# Patient Record
Sex: FEMALE | Race: AMERICAN INDIAN OR ALASKA NATIVE
[De-identification: names, ages, dates, MRNs, and addresses within clinical notes are randomized per-mention and may not be internally consistent; named-entity substitution may affect disease eponyms.]

---

## 2018-08-16 ENCOUNTER — HOSPITAL ENCOUNTER (EMERGENCY)
Dept: HOSPITAL 42 - ED | Age: 58
Discharge: HOME | End: 2018-08-16
Payer: MEDICAID

## 2018-08-16 VITALS
HEART RATE: 74 BPM | OXYGEN SATURATION: 99 % | DIASTOLIC BLOOD PRESSURE: 77 MMHG | SYSTOLIC BLOOD PRESSURE: 134 MMHG | RESPIRATION RATE: 18 BRPM

## 2018-08-16 VITALS — TEMPERATURE: 98.1 F

## 2018-08-16 DIAGNOSIS — I10: ICD-10-CM

## 2018-08-16 DIAGNOSIS — E78.00: ICD-10-CM

## 2018-08-16 DIAGNOSIS — M54.30: Primary | ICD-10-CM

## 2018-08-16 DIAGNOSIS — Z87.891: ICD-10-CM

## 2018-08-16 PROCEDURE — 99282 EMERGENCY DEPT VISIT SF MDM: CPT

## 2018-08-16 PROCEDURE — 96372 THER/PROPH/DIAG INJ SC/IM: CPT

## 2018-08-16 NOTE — ED PDOC
Arrival/HPI





- General


Chief Complaint: Back Pain


Time Seen by Provider: 18 07:17


Historian: Patient





- History of Present Illness


Narrative History of Present Illness (Text): 





18 07:27


58 year old female, whose past medical history includes hypertension and 

occasionally elevated cholesterol, who presents to the emergency department 

complaining of aching waxing and waning pain in lower back and top left of 

buttocks for the past 6-7 months, worse past few days. Patient notes the pain 

radiates down to the back of her left leg. Patient states the pain is worse at 

night and interrupts her sleep, noting exacerbation when trying to turn over. 

Patient states she takes Percocet, with mild relief. Patient notes some 

tingling and numbness, but states she is able to ambulate normal. Patient 

denies any known drug allergies, fevers, chills, chest pain, shortness of breath

, abdominal pain, nausea, vomiting, diarrhea, neck pain, urinary symptoms, 

headache, dizziness, or any other complaint. 





PMD: Dr. Cullen Mraquis





Time/Duration: Other (last couple of weeks)


Symptom Onset: Other (waxing and waning for past 6-7 months)


Symptom Course: Unchanged


Activities at Onset: Light





Past Medical History





- Provider Review


Nursing Documentation Reviewed: Yes





- Travel History


Have you recently traveled outside [place] w/in the past 3 mons?: No





- Infectious Disease


Hx of Infectious Diseases: None





- Reproductive


Menopause: Yes





- Cardiac


Hx Hypertension: Yes





- Musculoskeletal/Rheumatological


Hx Arthritis: Yes





- Psychiatric


Hx Substance Use: No





- Surgical History


Hx  Section: Yes (x3)


Hx Hysterectomy: Yes (partial)





- Anesthesia


Hx Anesthesia: Yes


Hx Anesthesia Reactions: No


Hx Malignant Hyperthermia: No





Family/Social History





- Physician Review


Nursing Documentation Reviewed: Yes


Family/Social History: No Known Family HX


Smoking Status: Former Smoker


Hx Alcohol Use: No


Hx Substance Use: No





Allergies/Home Meds


Allergies/Adverse Reactions: 


Allergies





No Known Allergies Allergy (Verified 18 06:52)


 








Home Medications: 


 Home Meds











 Medication  Instructions  Recorded  Confirmed


 


Acetaminophen/Oxycodone Hydr 1 tab PO DAILY 18





[Percocet 10/325 mg Tab]   


 


Aspirin [Ecotrin] 1 tab PO DAILY 18


 


Cholecalciferol [Vitamin D 1000 IU] 10,000 iu IM THU 18


 


amLODIPine [Norvasc] 1 tab PO DAILY 18














Review of Systems





- Physician Review


All systems were reviewed & negative as marked: Yes





- Review of Systems


Constitutional: Normal.  absent: Fevers


Eyes: Normal


ENT: Normal


Respiratory: Normal.  absent: SOB, Cough


Cardiovascular: Normal.  absent: Chest Pain


Gastrointestinal: Normal.  absent: Abdominal Pain, Diarrhea, Nausea, Vomiting


Genitourinary Female: Normal


Musculoskeletal: Back Pain (lower back pain), Other (pain mainly in left upper 

buttocks, radiating down to back of left leg).  absent: Normal


Skin: Normal


Neurological: Gait Changes (stiff walking).  absent: Normal, Headache


Endocrine: Normal


Hemo/Lymphatic: Normal


Psychiatric: Normal





Physical Exam


Vital Signs Reviewed: Yes


Vital Signs











  Temp Pulse Resp BP Pulse Ox


 


 18 08:22   74  18  134/77  99


 


 18 06:47  98.1 F  81  21  147/92 H  98











Temperature: Afebrile


Blood Pressure: Hypertensive


Pulse: Regular


Respiratory Rate: Normal


Appearance: Positive for: Well-Appearing, Non-Toxic


Pain Distress: Moderate


Mental Status: Positive for: Alert and Oriented X 3





- Systems Exam


Head: Present: Atraumatic, Normocephalic


Pupils: Present: PERRL


Extroacular Muscles: Present: EOMI


Conjunctiva: Present: Normal


Mouth: Present: Moist Mucous Membranes


Neck: Present: Normal Range of Motion


Respiratory/Chest: Present: Clear to Auscultation, Good Air Exchange.  No: 

Respiratory Distress, Accessory Muscle Use


Cardiovascular: Present: Regular Rate and Rhythm, Normal S1, S2.  No: Murmurs


Abdomen: No: Tenderness, Distention, Peritoneal Signs


Back: Present: Normal Inspection.  No: Pain with Leg Raise


Upper Extremity: Present: Normal Inspection.  No: Cyanosis, Edema


Lower Extremity: Present: Tenderness (tenderness to left lower lumbar area), 

Other (negative leg recess, no ataxia).  No: Normal Inspection, Edema


Neurological: Present: GCS=15, CN II-XII Intact, Speech Normal, Gait Normal


Skin: Present: Warm, Dry, Normal Color.  No: Rashes


Psychiatric: Present: Alert, Oriented x 3, Normal Insight, Normal Concentration





Medical Decision Making


ED Course and Treatment: 





18 07:27


Impression: Gay Murdock is a 58 year old female who presents to the Emergency 

department for waxing and waning lower back pain, worse last few weeks. 





DDx: Lumbar Radiculopathy, Sciatica, MSK





Plan:


-- Lidoderm


-- Toradol


-- Tylenol 325 mg


-- Reassess and disposition





Progress Notes:


18 07:27





Previous MRI results reviewed and recorded below, shows: 


Title: MRI, MR-Lumbar Spine W/O


Exam Date: 2018





Impression: 


1. No compression fracture, dislocation or acute bony pathology.


2. L2-L3 through L5-S1 DJD, central canal stenosis, neuroforaminal stenosis as 

detailed above.


3. Right renal cyst, left possible ovarian cyst, follow-up renal ultrasound, 

pelvic ultrasound evaluation advised. 








18 09:22


Patient felt comfortable after medications. She is driving so we will hold off 

on muscle relaxants. She will make sure to follow up with her Primary Care 

doctor this week and to return to the ED if symptoms or any other concerns. 





- Medication Orders


Current Medication Orders: 











Discontinued Medications





Acetaminophen (Tylenol 325mg Tab)  975 mg PO STAT STA


   Stop: 18 07:31


   Last Admin: 18 07:45  Dose: 975 mg





MAR Pain/Vitals


 Document     18 07:45  ARMIDA  (Rec: 18 07:46  ARMIDA BOWSER)


     Pain Reassessment


      Is This A Pain ReAssessment?               No


     Sleep


      Is patient sleeping during reassessment?   No


     Presence of Pain


      Presence of Pain                           Yes


     Pain Scale Used


      Pain Scale Used                            Numeric


     Location


      Intensity                                  8


      Scale Used                                 Numeric





Ketorolac Tromethamine (Toradol)  30 mg IM STAT STA


   Stop: 18 07:29


   Last Admin: 18 07:46  Dose: 30 mg





MAR Pain Assessment


 Document     18 07:46  ARMIDA  (Rec: 18 07:46  ARMIDA BOWSER)


     Pain Reassessment


      Is this a pain reassessment?               No


     Sleep


      Is patient sleeping during reassessment?   No


     Presence of Pain


      Presence of Pain                           Yes


IM Administration Charges


 Document     18 07:46  ARMIDA  (Rec: 18 07:46  ARMIDA BOWSER)


     Charges for Administration


      # of IM Administrations                    1





Lidocaine (Lidoderm)  1 ea TD STAT STA


   Stop: 18 07:30


   Last Admin: 18 07:46  Dose: 1 ea





MAR Transdermal Patch Site


 Document     18 07:46  ARMIDA  (Rec: 18 07:46  ARMIDA  [id number])


     Transdermal Patch Site


      Transdermal Patch Site                     Left Lower Back














- Scribe Statement


The provider has reviewed the documentation as recorded by the Scribe





Mayra Vasquez





All medical record entries made by the Scribe were at my direction and 

personally dictated by me. I have reviewed the chart and agree that the record 

accurately reflects my personal performance of the history, physical exam, 

medical decision making, and the department course for this patient. I have 

also personally directed, reviewed, and agree with the discharge instructions 

and disposition.





Disposition/Present on Arrival





- Present on Arrival


Any Indicators Present on Arrival: No


History of DVT/PE: No


History of Uncontrolled Diabetes: No


Urinary Catheter: No


History of Decub. Ulcer: No


History Surgical Site Infection Following: None





- Disposition


Have Diagnosis and Disposition been Completed?: Yes


Diagnosis: 


 Sciatica





Disposition: HOME/ ROUTINE


Disposition Time: 07:30


Patient Problems: 


 Current Active Problems











Problem Status Onset


 


Sciatica Acute  











Condition: GOOD


Discharge Instructions (ExitCare):  Sciatica (DC), Sciatica Exercises


Additional Instructions: 





GAY MURDOCK, thank you for letting us take care of you today. Your provider 

was Shiva Tao DO and you were treated for Sciatica. The emergency 

medical care you received today was directed at your acute symptoms. If you 

were prescribed any medication, please fill it and take as directed. It may 

take several days for your symptoms to resolve. Return to the Emergency 

Department if your symptoms worsen, do not improve, or if you have any other 

problems.





Please contact your doctor or call one of the physicians/clinics you have been 

referred to that are listed on the Patient Visit Information form that is 

included in your discharge packet. Bring any paperwork you were given at 

discharge with you along with any medications you are taking to your follow up 

visit. Our treatment cannot replace ongoing medical care by a primary care 

provider outside of the emergency department.





Thank you for allowing the [facility] team to be part of your care today.








If you had an X-Ray or CT scan: A Radiologist will review the ED reading if any 

change in treatment is needed we will contact you.***





If you had a blood, urine, or wound culture: It will take several days for the 

results, if any change in treatment is needed we will contact you.***





If you had an STI test: It will take 48 hours for the results. Please call 

after 1 week if you have not heard back.***


Prescriptions: 


Cyclobenzaprine [Flexeril] 5 mg PO TID PRN #20 tab


 PRN Reason: Muscle Spasm


Ibuprofen [Motrin] 600 mg PO Q6 PRN #30 tab


 PRN Reason: Pain, Moderate (4-7)


Lidocaine 5% [Lidoderm] 1 ea TD DAILY PRN #4 patch


 PRN Reason: Pain, Moderate (4-7)


Referrals: 


Renata Oconnor MD [Staff Provider] - Follow up with primary


Forms:  [employer] (English), WORK NOTE